# Patient Record
Sex: FEMALE | Race: WHITE | Employment: UNEMPLOYED | URBAN - METROPOLITAN AREA
[De-identification: names, ages, dates, MRNs, and addresses within clinical notes are randomized per-mention and may not be internally consistent; named-entity substitution may affect disease eponyms.]

---

## 2020-08-17 ENCOUNTER — APPOINTMENT (OUTPATIENT)
Dept: GENERAL RADIOLOGY | Age: 60
DRG: 872 | End: 2020-08-17

## 2020-08-17 ENCOUNTER — APPOINTMENT (OUTPATIENT)
Dept: CT IMAGING | Age: 60
DRG: 872 | End: 2020-08-17

## 2020-08-17 ENCOUNTER — HOSPITAL ENCOUNTER (INPATIENT)
Age: 60
LOS: 2 days | Discharge: HOME | DRG: 872 | End: 2020-08-19
Attending: EMERGENCY MEDICINE | Admitting: HOSPITALIST
Payer: MEDICAID

## 2020-08-17 PROBLEM — A41.9 SEPSIS DUE TO URINARY TRACT INFECTION (HCC): Status: ACTIVE | Noted: 2020-08-17

## 2020-08-17 PROBLEM — N39.0 SEPSIS DUE TO URINARY TRACT INFECTION (HCC): Status: ACTIVE | Noted: 2020-08-17

## 2020-08-17 PROBLEM — D69.6 THROMBOCYTOPENIA (HCC): Status: ACTIVE | Noted: 2020-08-17

## 2020-08-17 PROBLEM — F10.20 ALCOHOLISM, CHRONIC (HCC): Status: ACTIVE | Noted: 2020-08-17

## 2020-08-17 PROBLEM — E87.20 LACTIC ACIDOSIS: Status: ACTIVE | Noted: 2020-08-17

## 2020-08-17 PROBLEM — K70.30 ALCOHOLIC CIRRHOSIS (HCC): Status: ACTIVE | Noted: 2020-08-17

## 2020-08-17 LAB
A/G RATIO: 1.4 (ref 1.1–2.2)
A/G RATIO: 1.5 (ref 1.1–2.2)
ABO/RH: NORMAL
ACETAMINOPHEN LEVEL: <5 UG/ML (ref 10–30)
ALBUMIN SERPL-MCNC: 4.1 G/DL (ref 3.4–5)
ALBUMIN SERPL-MCNC: 4.6 G/DL (ref 3.4–5)
ALP BLD-CCNC: 105 U/L (ref 40–129)
ALP BLD-CCNC: 125 U/L (ref 40–129)
ALT SERPL-CCNC: 64 U/L (ref 10–40)
ALT SERPL-CCNC: 70 U/L (ref 10–40)
AMPHETAMINE SCREEN, URINE: NORMAL
ANION GAP SERPL CALCULATED.3IONS-SCNC: 18 MMOL/L (ref 3–16)
ANION GAP SERPL CALCULATED.3IONS-SCNC: 25 MMOL/L (ref 3–16)
ANTIBODY SCREEN: NORMAL
APTT: 30.6 SEC (ref 24.2–36.2)
AST SERPL-CCNC: 144 U/L (ref 15–37)
AST SERPL-CCNC: 161 U/L (ref 15–37)
BACTERIA: ABNORMAL /HPF
BARBITURATE SCREEN URINE: NORMAL
BASE EXCESS ARTERIAL: 1.6 MMOL/L (ref -3–3)
BASOPHILS ABSOLUTE: 0 K/UL (ref 0–0.2)
BASOPHILS RELATIVE PERCENT: 0.8 %
BENZODIAZEPINE SCREEN, URINE: NORMAL
BETA-HYDROXYBUTYRATE: 3 MMOL/L (ref 0–0.27)
BILIRUB SERPL-MCNC: 1.4 MG/DL (ref 0–1)
BILIRUB SERPL-MCNC: 2 MG/DL (ref 0–1)
BILIRUBIN DIRECT: 0.9 MG/DL (ref 0–0.3)
BILIRUBIN URINE: ABNORMAL
BILIRUBIN, INDIRECT: 1.1 MG/DL (ref 0–1)
BLOOD, URINE: ABNORMAL
BUN BLDV-MCNC: 11 MG/DL (ref 7–20)
BUN BLDV-MCNC: 13 MG/DL (ref 7–20)
CALCIUM SERPL-MCNC: 8.5 MG/DL (ref 8.3–10.6)
CALCIUM SERPL-MCNC: 9.5 MG/DL (ref 8.3–10.6)
CANNABINOID SCREEN URINE: NORMAL
CARBOXYHEMOGLOBIN ARTERIAL: 3.6 % (ref 0–1.5)
CHLORIDE BLD-SCNC: 98 MMOL/L (ref 99–110)
CHLORIDE BLD-SCNC: 99 MMOL/L (ref 99–110)
CLARITY: ABNORMAL
CO2: 14 MMOL/L (ref 21–32)
CO2: 24 MMOL/L (ref 21–32)
COCAINE METABOLITE SCREEN URINE: NORMAL
COLOR: ABNORMAL
CREAT SERPL-MCNC: <0.5 MG/DL (ref 0.6–1.1)
CREAT SERPL-MCNC: <0.5 MG/DL (ref 0.6–1.1)
D DIMER: 288 NG/ML DDU (ref 0–229)
EOSINOPHILS ABSOLUTE: 0 K/UL (ref 0–0.6)
EOSINOPHILS RELATIVE PERCENT: 0.8 %
EPITHELIAL CELLS, UA: 18 /HPF (ref 0–5)
ETHANOL: 331 MG/DL (ref 0–0.08)
FIBRINOGEN: 247 MG/DL (ref 200–397)
GFR AFRICAN AMERICAN: >60
GFR AFRICAN AMERICAN: >60
GFR NON-AFRICAN AMERICAN: >60
GFR NON-AFRICAN AMERICAN: >60
GLOBULIN: 2.7 G/DL
GLOBULIN: 3.3 G/DL
GLUCOSE BLD-MCNC: 165 MG/DL (ref 70–99)
GLUCOSE BLD-MCNC: 270 MG/DL (ref 70–99)
GLUCOSE BLD-MCNC: 96 MG/DL (ref 70–99)
GLUCOSE URINE: NEGATIVE MG/DL
HCO3 ARTERIAL: 26.1 MMOL/L (ref 21–29)
HCT VFR BLD CALC: 48.3 % (ref 36–48)
HEMOGLOBIN, ART, EXTENDED: 15.8 G/DL (ref 12–16)
HEMOGLOBIN: 16.5 G/DL (ref 12–16)
INR BLD: 0.99 (ref 0.86–1.14)
KETONES, URINE: 40 MG/DL
LACTATE DEHYDROGENASE: 232 U/L (ref 100–190)
LACTIC ACID, SEPSIS: 4.7 MMOL/L (ref 0.4–1.9)
LACTIC ACID, SEPSIS: 7.2 MMOL/L (ref 0.4–1.9)
LACTIC ACID, SEPSIS: 9.5 MMOL/L (ref 0.4–1.9)
LACTIC ACID: 9.6 MMOL/L (ref 0.4–2)
LEUKOCYTE ESTERASE, URINE: ABNORMAL
LIPASE: 9 U/L (ref 13–60)
LYMPHOCYTES ABSOLUTE: 1.6 K/UL (ref 1–5.1)
LYMPHOCYTES RELATIVE PERCENT: 38.4 %
Lab: NORMAL
MCH RBC QN AUTO: 35.7 PG (ref 26–34)
MCHC RBC AUTO-ENTMCNC: 34.1 G/DL (ref 31–36)
MCV RBC AUTO: 104.5 FL (ref 80–100)
METHADONE SCREEN, URINE: NORMAL
METHEMOGLOBIN ARTERIAL: ABNORMAL %
MICROSCOPIC EXAMINATION: YES
MONOCYTES ABSOLUTE: 0.4 K/UL (ref 0–1.3)
MONOCYTES RELATIVE PERCENT: 8.4 %
NEUTROPHILS ABSOLUTE: 2.2 K/UL (ref 1.7–7.7)
NEUTROPHILS RELATIVE PERCENT: 51.6 %
NITRITE, URINE: POSITIVE
O2 CONTENT ARTERIAL: 20 ML/DL
O2 SAT, ARTERIAL: 95.1 %
O2 THERAPY: ABNORMAL
OPIATE SCREEN URINE: NORMAL
OXYCODONE URINE: NORMAL
PCO2 ARTERIAL: 40.1 MMHG (ref 35–45)
PDW BLD-RTO: 13.2 % (ref 12.4–15.4)
PERFORMED ON: ABNORMAL
PH ARTERIAL: 7.42 (ref 7.35–7.45)
PH UA: 5.5
PH UA: 5.5 (ref 5–8)
PHENCYCLIDINE SCREEN URINE: NORMAL
PLATELET # BLD: 104 K/UL (ref 135–450)
PMV BLD AUTO: 8.1 FL (ref 5–10.5)
PO2 ARTERIAL: 78.6 MMHG (ref 75–108)
POTASSIUM REFLEX MAGNESIUM: 4.1 MMOL/L (ref 3.5–5.1)
POTASSIUM REFLEX MAGNESIUM: 4.1 MMOL/L (ref 3.5–5.1)
PRO-BNP: 71 PG/ML (ref 0–124)
PROCALCITONIN: 0.13 NG/ML (ref 0–0.15)
PROPOXYPHENE SCREEN: NORMAL
PROTEIN UA: 30 MG/DL
PROTHROMBIN TIME: 11.5 SEC (ref 10–13.2)
RBC # BLD: 4.62 M/UL (ref 4–5.2)
RBC UA: 1 /HPF (ref 0–4)
SALICYLATE, SERUM: <0.3 MG/DL (ref 15–30)
SODIUM BLD-SCNC: 137 MMOL/L (ref 136–145)
SODIUM BLD-SCNC: 141 MMOL/L (ref 136–145)
SPECIFIC GRAVITY UA: 1.02 (ref 1–1.03)
TCO2 ARTERIAL: 61.3 MMOL/L
TOTAL CK: 71 U/L (ref 26–192)
TOTAL PROTEIN: 6.8 G/DL (ref 6.4–8.2)
TOTAL PROTEIN: 7.9 G/DL (ref 6.4–8.2)
TROPONIN: <0.01 NG/ML
URINE REFLEX TO CULTURE: ABNORMAL
URINE TYPE: ABNORMAL
UROBILINOGEN, URINE: 1 E.U./DL
WBC # BLD: 4.3 K/UL (ref 4–11)
WBC UA: 6 /HPF (ref 0–5)

## 2020-08-17 PROCEDURE — 84146 ASSAY OF PROLACTIN: CPT

## 2020-08-17 PROCEDURE — G0480 DRUG TEST DEF 1-7 CLASSES: HCPCS

## 2020-08-17 PROCEDURE — 87040 BLOOD CULTURE FOR BACTERIA: CPT

## 2020-08-17 PROCEDURE — 6360000002 HC RX W HCPCS: Performed by: HOSPITALIST

## 2020-08-17 PROCEDURE — 85730 THROMBOPLASTIN TIME PARTIAL: CPT

## 2020-08-17 PROCEDURE — 81001 URINALYSIS AUTO W/SCOPE: CPT

## 2020-08-17 PROCEDURE — 36415 COLL VENOUS BLD VENIPUNCTURE: CPT

## 2020-08-17 PROCEDURE — 36600 WITHDRAWAL OF ARTERIAL BLOOD: CPT

## 2020-08-17 PROCEDURE — 93005 ELECTROCARDIOGRAM TRACING: CPT | Performed by: EMERGENCY MEDICINE

## 2020-08-17 PROCEDURE — 2700000000 HC OXYGEN THERAPY PER DAY

## 2020-08-17 PROCEDURE — 99285 EMERGENCY DEPT VISIT HI MDM: CPT

## 2020-08-17 PROCEDURE — 2000000000 HC ICU R&B

## 2020-08-17 PROCEDURE — 83036 HEMOGLOBIN GLYCOSYLATED A1C: CPT

## 2020-08-17 PROCEDURE — 85384 FIBRINOGEN ACTIVITY: CPT

## 2020-08-17 PROCEDURE — 80053 COMPREHEN METABOLIC PANEL: CPT

## 2020-08-17 PROCEDURE — 82550 ASSAY OF CK (CPK): CPT

## 2020-08-17 PROCEDURE — 6370000000 HC RX 637 (ALT 250 FOR IP): Performed by: PHYSICIAN ASSISTANT

## 2020-08-17 PROCEDURE — 85025 COMPLETE CBC W/AUTO DIFF WBC: CPT

## 2020-08-17 PROCEDURE — 86901 BLOOD TYPING SEROLOGIC RH(D): CPT

## 2020-08-17 PROCEDURE — 82803 BLOOD GASES ANY COMBINATION: CPT

## 2020-08-17 PROCEDURE — 2500000003 HC RX 250 WO HCPCS: Performed by: HOSPITALIST

## 2020-08-17 PROCEDURE — 2500000003 HC RX 250 WO HCPCS: Performed by: PHYSICIAN ASSISTANT

## 2020-08-17 PROCEDURE — 86850 RBC ANTIBODY SCREEN: CPT

## 2020-08-17 PROCEDURE — 82728 ASSAY OF FERRITIN: CPT

## 2020-08-17 PROCEDURE — 84484 ASSAY OF TROPONIN QUANT: CPT

## 2020-08-17 PROCEDURE — 96374 THER/PROPH/DIAG INJ IV PUSH: CPT

## 2020-08-17 PROCEDURE — 83880 ASSAY OF NATRIURETIC PEPTIDE: CPT

## 2020-08-17 PROCEDURE — 2580000003 HC RX 258: Performed by: HOSPITALIST

## 2020-08-17 PROCEDURE — 84145 PROCALCITONIN (PCT): CPT

## 2020-08-17 PROCEDURE — 82010 KETONE BODYS QUAN: CPT

## 2020-08-17 PROCEDURE — 74174 CTA ABD&PLVS W/CONTRAST: CPT

## 2020-08-17 PROCEDURE — U0003 INFECTIOUS AGENT DETECTION BY NUCLEIC ACID (DNA OR RNA); SEVERE ACUTE RESPIRATORY SYNDROME CORONAVIRUS 2 (SARS-COV-2) (CORONAVIRUS DISEASE [COVID-19]), AMPLIFIED PROBE TECHNIQUE, MAKING USE OF HIGH THROUGHPUT TECHNOLOGIES AS DESCRIBED BY CMS-2020-01-R: HCPCS

## 2020-08-17 PROCEDURE — 6360000002 HC RX W HCPCS: Performed by: EMERGENCY MEDICINE

## 2020-08-17 PROCEDURE — 6370000000 HC RX 637 (ALT 250 FOR IP): Performed by: HOSPITALIST

## 2020-08-17 PROCEDURE — 6360000002 HC RX W HCPCS: Performed by: PHYSICIAN ASSISTANT

## 2020-08-17 PROCEDURE — 83605 ASSAY OF LACTIC ACID: CPT

## 2020-08-17 PROCEDURE — 83690 ASSAY OF LIPASE: CPT

## 2020-08-17 PROCEDURE — 94640 AIRWAY INHALATION TREATMENT: CPT

## 2020-08-17 PROCEDURE — 80307 DRUG TEST PRSMV CHEM ANLYZR: CPT

## 2020-08-17 PROCEDURE — 2580000003 HC RX 258: Performed by: PHYSICIAN ASSISTANT

## 2020-08-17 PROCEDURE — 85610 PROTHROMBIN TIME: CPT

## 2020-08-17 PROCEDURE — 71045 X-RAY EXAM CHEST 1 VIEW: CPT

## 2020-08-17 PROCEDURE — 6360000004 HC RX CONTRAST MEDICATION: Performed by: PHYSICIAN ASSISTANT

## 2020-08-17 PROCEDURE — 85379 FIBRIN DEGRADATION QUANT: CPT

## 2020-08-17 PROCEDURE — 94760 N-INVAS EAR/PLS OXIMETRY 1: CPT

## 2020-08-17 PROCEDURE — 83615 LACTATE (LD) (LDH) ENZYME: CPT

## 2020-08-17 PROCEDURE — 86900 BLOOD TYPING SEROLOGIC ABO: CPT

## 2020-08-17 PROCEDURE — 96375 TX/PRO/DX INJ NEW DRUG ADDON: CPT

## 2020-08-17 RX ORDER — SODIUM CHLORIDE, SODIUM LACTATE, POTASSIUM CHLORIDE, AND CALCIUM CHLORIDE .6; .31; .03; .02 G/100ML; G/100ML; G/100ML; G/100ML
30 INJECTION, SOLUTION INTRAVENOUS ONCE
Status: COMPLETED | OUTPATIENT
Start: 2020-08-17 | End: 2020-08-17

## 2020-08-17 RX ORDER — LORAZEPAM 1 MG/1
4 TABLET ORAL
Status: DISCONTINUED | OUTPATIENT
Start: 2020-08-17 | End: 2020-08-19 | Stop reason: HOSPADM

## 2020-08-17 RX ORDER — LORAZEPAM 1 MG/1
3 TABLET ORAL
Status: DISCONTINUED | OUTPATIENT
Start: 2020-08-17 | End: 2020-08-19 | Stop reason: HOSPADM

## 2020-08-17 RX ORDER — ACETAMINOPHEN 325 MG/1
650 TABLET ORAL EVERY 6 HOURS PRN
Status: DISCONTINUED | OUTPATIENT
Start: 2020-08-17 | End: 2020-08-19 | Stop reason: HOSPADM

## 2020-08-17 RX ORDER — PROMETHAZINE HYDROCHLORIDE 25 MG/1
12.5 TABLET ORAL EVERY 6 HOURS PRN
Status: DISCONTINUED | OUTPATIENT
Start: 2020-08-17 | End: 2020-08-19 | Stop reason: HOSPADM

## 2020-08-17 RX ORDER — IPRATROPIUM BROMIDE AND ALBUTEROL SULFATE 2.5; .5 MG/3ML; MG/3ML
1 SOLUTION RESPIRATORY (INHALATION) ONCE
Status: COMPLETED | OUTPATIENT
Start: 2020-08-17 | End: 2020-08-17

## 2020-08-17 RX ORDER — METHYLPREDNISOLONE SODIUM SUCCINATE 125 MG/2ML
125 INJECTION, POWDER, LYOPHILIZED, FOR SOLUTION INTRAMUSCULAR; INTRAVENOUS ONCE
Status: COMPLETED | OUTPATIENT
Start: 2020-08-17 | End: 2020-08-17

## 2020-08-17 RX ORDER — LORAZEPAM 2 MG/ML
2 INJECTION INTRAMUSCULAR
Status: DISCONTINUED | OUTPATIENT
Start: 2020-08-17 | End: 2020-08-19 | Stop reason: HOSPADM

## 2020-08-17 RX ORDER — DEXTROSE MONOHYDRATE 25 G/50ML
INJECTION, SOLUTION INTRAVENOUS
Status: DISPENSED
Start: 2020-08-17 | End: 2020-08-18

## 2020-08-17 RX ORDER — NICOTINE POLACRILEX 4 MG
15 LOZENGE BUCCAL PRN
Status: DISCONTINUED | OUTPATIENT
Start: 2020-08-17 | End: 2020-08-19 | Stop reason: HOSPADM

## 2020-08-17 RX ORDER — MAGNESIUM SULFATE 1 G/100ML
1 INJECTION INTRAVENOUS PRN
Status: DISCONTINUED | OUTPATIENT
Start: 2020-08-17 | End: 2020-08-19 | Stop reason: HOSPADM

## 2020-08-17 RX ORDER — DEXTROSE, SODIUM CHLORIDE, SODIUM LACTATE, POTASSIUM CHLORIDE, AND CALCIUM CHLORIDE 5; .6; .31; .03; .02 G/100ML; G/100ML; G/100ML; G/100ML; G/100ML
INJECTION, SOLUTION INTRAVENOUS
Status: DISPENSED
Start: 2020-08-17 | End: 2020-08-18

## 2020-08-17 RX ORDER — ACETAMINOPHEN 650 MG/1
650 SUPPOSITORY RECTAL EVERY 6 HOURS PRN
Status: DISCONTINUED | OUTPATIENT
Start: 2020-08-17 | End: 2020-08-19 | Stop reason: HOSPADM

## 2020-08-17 RX ORDER — LORAZEPAM 1 MG/1
2 TABLET ORAL
Status: DISCONTINUED | OUTPATIENT
Start: 2020-08-17 | End: 2020-08-19 | Stop reason: HOSPADM

## 2020-08-17 RX ORDER — POTASSIUM CHLORIDE 20 MEQ/1
40 TABLET, EXTENDED RELEASE ORAL PRN
Status: DISCONTINUED | OUTPATIENT
Start: 2020-08-17 | End: 2020-08-19 | Stop reason: HOSPADM

## 2020-08-17 RX ORDER — DEXTROSE, SODIUM CHLORIDE, SODIUM LACTATE, POTASSIUM CHLORIDE, AND CALCIUM CHLORIDE 5; .6; .31; .03; .02 G/100ML; G/100ML; G/100ML; G/100ML; G/100ML
INJECTION, SOLUTION INTRAVENOUS CONTINUOUS
Status: DISPENSED | OUTPATIENT
Start: 2020-08-17 | End: 2020-08-18

## 2020-08-17 RX ORDER — DEXTROSE MONOHYDRATE 25 G/50ML
12.5 INJECTION, SOLUTION INTRAVENOUS ONCE
Status: COMPLETED | OUTPATIENT
Start: 2020-08-17 | End: 2020-08-17

## 2020-08-17 RX ORDER — SODIUM CHLORIDE 0.9 % (FLUSH) 0.9 %
10 SYRINGE (ML) INJECTION PRN
Status: DISCONTINUED | OUTPATIENT
Start: 2020-08-17 | End: 2020-08-17 | Stop reason: SDUPTHER

## 2020-08-17 RX ORDER — NICOTINE 21 MG/24HR
1 PATCH, TRANSDERMAL 24 HOURS TRANSDERMAL DAILY
Status: DISCONTINUED | OUTPATIENT
Start: 2020-08-17 | End: 2020-08-19 | Stop reason: HOSPADM

## 2020-08-17 RX ORDER — LORAZEPAM 1 MG/1
1 TABLET ORAL
Status: DISCONTINUED | OUTPATIENT
Start: 2020-08-17 | End: 2020-08-19 | Stop reason: HOSPADM

## 2020-08-17 RX ORDER — SODIUM CHLORIDE 0.9 % (FLUSH) 0.9 %
10 SYRINGE (ML) INJECTION EVERY 12 HOURS SCHEDULED
Status: DISCONTINUED | OUTPATIENT
Start: 2020-08-17 | End: 2020-08-17 | Stop reason: SDUPTHER

## 2020-08-17 RX ORDER — INSULIN LISPRO 100 [IU]/ML
0-6 INJECTION, SOLUTION INTRAVENOUS; SUBCUTANEOUS EVERY 4 HOURS
Status: DISCONTINUED | OUTPATIENT
Start: 2020-08-17 | End: 2020-08-19 | Stop reason: HOSPADM

## 2020-08-17 RX ORDER — SODIUM CHLORIDE 0.9 % (FLUSH) 0.9 %
10 SYRINGE (ML) INJECTION EVERY 12 HOURS SCHEDULED
Status: DISCONTINUED | OUTPATIENT
Start: 2020-08-17 | End: 2020-08-19 | Stop reason: HOSPADM

## 2020-08-17 RX ORDER — ACETAMINOPHEN 325 MG/1
650 TABLET ORAL EVERY 6 HOURS PRN
Status: DISCONTINUED | OUTPATIENT
Start: 2020-08-17 | End: 2020-08-18 | Stop reason: SDUPTHER

## 2020-08-17 RX ORDER — LORAZEPAM 2 MG/ML
3 INJECTION INTRAMUSCULAR
Status: DISCONTINUED | OUTPATIENT
Start: 2020-08-17 | End: 2020-08-19 | Stop reason: HOSPADM

## 2020-08-17 RX ORDER — ACETAMINOPHEN 650 MG/1
650 SUPPOSITORY RECTAL EVERY 6 HOURS PRN
Status: DISCONTINUED | OUTPATIENT
Start: 2020-08-17 | End: 2020-08-18 | Stop reason: SDUPTHER

## 2020-08-17 RX ORDER — ALBUTEROL SULFATE 2.5 MG/3ML
5 SOLUTION RESPIRATORY (INHALATION) ONCE
Status: DISCONTINUED | OUTPATIENT
Start: 2020-08-17 | End: 2020-08-17

## 2020-08-17 RX ORDER — ONDANSETRON 2 MG/ML
4 INJECTION INTRAMUSCULAR; INTRAVENOUS EVERY 6 HOURS PRN
Status: DISCONTINUED | OUTPATIENT
Start: 2020-08-17 | End: 2020-08-19 | Stop reason: HOSPADM

## 2020-08-17 RX ORDER — ONDANSETRON 2 MG/ML
4 INJECTION INTRAMUSCULAR; INTRAVENOUS ONCE
Status: DISCONTINUED | OUTPATIENT
Start: 2020-08-17 | End: 2020-08-17 | Stop reason: HOSPADM

## 2020-08-17 RX ORDER — DEXTROSE MONOHYDRATE 50 MG/ML
100 INJECTION, SOLUTION INTRAVENOUS PRN
Status: DISCONTINUED | OUTPATIENT
Start: 2020-08-17 | End: 2020-08-19 | Stop reason: HOSPADM

## 2020-08-17 RX ORDER — DEXTROSE MONOHYDRATE 25 G/50ML
12.5 INJECTION, SOLUTION INTRAVENOUS PRN
Status: DISCONTINUED | OUTPATIENT
Start: 2020-08-17 | End: 2020-08-19 | Stop reason: HOSPADM

## 2020-08-17 RX ORDER — SODIUM CHLORIDE 0.9 % (FLUSH) 0.9 %
10 SYRINGE (ML) INJECTION PRN
Status: DISCONTINUED | OUTPATIENT
Start: 2020-08-17 | End: 2020-08-19 | Stop reason: HOSPADM

## 2020-08-17 RX ORDER — CHOLECALCIFEROL (VITAMIN D3) 125 MCG
1 TABLET ORAL DAILY
COMMUNITY

## 2020-08-17 RX ORDER — POTASSIUM CHLORIDE 7.45 MG/ML
10 INJECTION INTRAVENOUS PRN
Status: DISCONTINUED | OUTPATIENT
Start: 2020-08-17 | End: 2020-08-19 | Stop reason: HOSPADM

## 2020-08-17 RX ORDER — LORAZEPAM 2 MG/ML
1 INJECTION INTRAMUSCULAR
Status: DISCONTINUED | OUTPATIENT
Start: 2020-08-17 | End: 2020-08-19 | Stop reason: HOSPADM

## 2020-08-17 RX ORDER — LORAZEPAM 2 MG/ML
4 INJECTION INTRAMUSCULAR
Status: DISCONTINUED | OUTPATIENT
Start: 2020-08-17 | End: 2020-08-19 | Stop reason: HOSPADM

## 2020-08-17 RX ADMIN — Medication 1 G: at 15:06

## 2020-08-17 RX ADMIN — SODIUM BICARBONATE 50 MEQ: 84 INJECTION, SOLUTION INTRAVENOUS at 19:09

## 2020-08-17 RX ADMIN — METHYLPREDNISOLONE SODIUM SUCCINATE 125 MG: 125 INJECTION, POWDER, FOR SOLUTION INTRAMUSCULAR; INTRAVENOUS at 11:57

## 2020-08-17 RX ADMIN — IPRATROPIUM BROMIDE AND ALBUTEROL SULFATE 1 AMPULE: .5; 3 SOLUTION RESPIRATORY (INHALATION) at 12:21

## 2020-08-17 RX ADMIN — LORAZEPAM 1 MG: 2 INJECTION INTRAMUSCULAR; INTRAVENOUS at 19:28

## 2020-08-17 RX ADMIN — Medication 10 ML: at 22:25

## 2020-08-17 RX ADMIN — DEXTROSE MONOHYDRATE 12.5 G: 500 INJECTION PARENTERAL at 19:08

## 2020-08-17 RX ADMIN — IOPAMIDOL 100 ML: 755 INJECTION, SOLUTION INTRAVENOUS at 16:24

## 2020-08-17 RX ADMIN — INSULIN LISPRO 3 UNITS: 100 INJECTION, SOLUTION INTRAVENOUS; SUBCUTANEOUS at 22:31

## 2020-08-17 RX ADMIN — VANCOMYCIN HYDROCHLORIDE 1000 MG: 1 INJECTION, POWDER, LYOPHILIZED, FOR SOLUTION INTRAVENOUS at 22:18

## 2020-08-17 RX ADMIN — SODIUM CHLORIDE, SODIUM LACTATE, POTASSIUM CHLORIDE, CALCIUM CHLORIDE AND DEXTROSE MONOHYDRATE: 5; 600; 310; 30; 20 INJECTION, SOLUTION INTRAVENOUS at 19:13

## 2020-08-17 RX ADMIN — FOLIC ACID: 5 INJECTION, SOLUTION INTRAMUSCULAR; INTRAVENOUS; SUBCUTANEOUS at 11:58

## 2020-08-17 RX ADMIN — SODIUM CHLORIDE, POTASSIUM CHLORIDE, SODIUM LACTATE AND CALCIUM CHLORIDE 1986 ML: 600; 310; 30; 20 INJECTION, SOLUTION INTRAVENOUS at 15:06

## 2020-08-17 RX ADMIN — ALBUTEROL SULFATE 5 MG: 2.5 SOLUTION RESPIRATORY (INHALATION) at 12:21

## 2020-08-17 ASSESSMENT — ENCOUNTER SYMPTOMS
BACK PAIN: 0
WHEEZING: 1
VOMITING: 0
NAUSEA: 1
COUGH: 1
CHEST TIGHTNESS: 0
DIARRHEA: 0
ABDOMINAL PAIN: 0
SHORTNESS OF BREATH: 1

## 2020-08-17 ASSESSMENT — PAIN SCALES - GENERAL
PAINLEVEL_OUTOF10: 0
PAINLEVEL_OUTOF10: 0

## 2020-08-17 NOTE — ED PROVIDER NOTES
Patient states that at this time she thinks that time to turn her life around. She denies that she is experiencing substernal chest pain. She denies fevers and or chills. She denies a history of DVT and or PE. She denies unilateral leg pain or swelling or history of thromboembolic events. Patient states that she does not have abdominal or flank pain. No vomiting or diarrhea. No additional complaints or concerns at present. Nursing Notes were all reviewed and agreed with or any disagreements were addressed in the HPI. REVIEW OF SYSTEMS    (2-9 systems for level 4, 10 or more for level 5)     Review of Systems   Constitutional: Positive for fatigue. Negative for activity change, chills and fever. Respiratory: Positive for cough, shortness of breath and wheezing. Negative for chest tightness. Cardiovascular: Negative for chest pain, palpitations and leg swelling. Gastrointestinal: Positive for nausea. Negative for abdominal pain, diarrhea and vomiting. Genitourinary: Negative for dysuria and flank pain. Musculoskeletal: Negative for back pain and myalgias. Skin: Negative for rash and wound. Neurological: Positive for weakness. Negative for headaches. Psychiatric/Behavioral: Positive for sleep disturbance. Negative for agitation, confusion, hallucinations, self-injury and suicidal ideas. The patient is nervous/anxious. Positives and Pertinent negatives as per HPI. Except as noted above in the ROS, all other systems were reviewed and negative. PAST MEDICAL HISTORY   History reviewed. No pertinent past medical history. SURGICAL HISTORY   History reviewed. No pertinent surgical history.       CURRENTMEDICATIONS       Previous Medications    B COMPLEX VITAMINS (VITAMIN B COMPLEX PO)    Take 1 tablet by mouth daily    CAFFEINE-MAGNESIUM SALICYLATE (DIUREX PO)    Take 1 tablet by mouth daily    ERGOCALCIFEROL (VITAMIN D2) 50 MCG (2000 UT) TABS    Take 1 tablet by mouth daily MULTIPLE VITAMINS-MINERALS (MULTIVITAMIN ADULT PO)    Take 1 tablet by mouth daily         ALLERGIES     Patient has no known allergies. FAMILYHISTORY     History reviewed. No pertinent family history. SOCIAL HISTORY       Social History     Tobacco Use    Smoking status: Not on file   Substance Use Topics    Alcohol use: Yes     Comment: pint a day    Drug use: Not on file       SCREENINGS             PHYSICAL EXAM    (up to 7 for level 4, 8 or more for level 5)     ED Triage Vitals [08/17/20 1103]   BP Temp Temp Source Pulse Resp SpO2 Height Weight   (!) 164/71 98.3 °F (36.8 °C) Temporal 106 24 93 % 5' 9\" (1.753 m) 160 lb (72.6 kg)       Physical Exam  Vitals signs and nursing note reviewed. Constitutional:       General: She is awake. She is not in acute distress. Appearance: Normal appearance. She is well-developed. She is not diaphoretic. HENT:      Head: Normocephalic and atraumatic. No raccoon eyes or Alonso's sign. Right Ear: Hearing and external ear normal.      Left Ear: Hearing and external ear normal.      Nose: Nose normal.      Mouth/Throat:      Lips: Pink. Mouth: Mucous membranes are moist.   Eyes:      General: No scleral icterus. Right eye: No discharge. Left eye: No discharge. Conjunctiva/sclera: Conjunctivae normal.   Neck:      Musculoskeletal: Normal range of motion. Vascular: No JVD. Cardiovascular:      Rate and Rhythm: Regular rhythm. Tachycardia present. Heart sounds: No murmur. No friction rub. No gallop. Pulmonary:      Effort: Pulmonary effort is normal. Tachypnea present. No accessory muscle usage or respiratory distress. Breath sounds: Normal air entry. Wheezing present. No rhonchi or rales. Abdominal:      General: There is no distension. Palpations: Abdomen is soft. Abdomen is not rigid. There is no mass. Tenderness: There is no abdominal tenderness. There is no guarding or rebound.    Skin:     General: Skin is warm and dry. Neurological:      Mental Status: She is alert and oriented to person, place, and time. GCS: GCS eye subscore is 4. GCS verbal subscore is 5. GCS motor subscore is 6. Cranial Nerves: No cranial nerve deficit. Sensory: No sensory deficit. Coordination: Coordination normal.   Psychiatric:         Behavior: Behavior normal. Behavior is cooperative.          DIAGNOSTIC RESULTS   LABS:    Labs Reviewed   CBC WITH AUTO DIFFERENTIAL - Abnormal; Notable for the following components:       Result Value    Hemoglobin 16.5 (*)     Hematocrit 48.3 (*)     .5 (*)     MCH 35.7 (*)     Platelets 218 (*)     All other components within normal limits    Narrative:     Performed at:  [facility] [address]   Phone [phone]   COMPREHENSIVE METABOLIC PANEL W/ REFLEX TO MG FOR LOW K - Abnormal; Notable for the following components:    Anion Gap 18 (*)     CREATININE <0.5 (*)     Total Bilirubin 2.0 (*)     ALT 70 (*)      (*)     All other components within normal limits    Narrative:     Performed at:  [facility] [address],  [place], [address]   Phone [phone]   HEPATIC FUNCTION PANEL - Abnormal; Notable for the following components:    Bilirubin, Direct 0.9 (*)     Bilirubin, Indirect 1.1 (*)     All other components within normal limits    Narrative:     Performed at:  [facility] [address]   Phone [phone]   LIPASE - Abnormal; Notable for the following components:    Lipase 9.0 (*)     All other components within normal limits    Narrative:     Performed at:  [facility] [address]   Phone [phone]   URINE RT REFLEX TO CULTURE - Abnormal; Notable for the following components:    Color, UA DARK YELLOW (*)     Clarity, UA CLOUDY (*) [phone]   CULTURE, BLOOD 1   CULTURE, BLOOD 2   PROTIME-INR    Narrative:     Performed at:  [facility]  [address] [place],  [place], [address]   Phone [phone]   ETHANOL    Narrative:     Performed at:  [facility]  [address] [facility] [facility],  [place], [address]   Phone [phone]   URINE DRUG SCREEN    Narrative:     Performed at:  [facility] [address] [facility],  [place], [address]   Phone [phone]   TROPONIN    Narrative:     Performed at:  [facility] [address] [facility],  [place], [address]   Phone [phone]   BRAIN NATRIURETIC PEPTIDE    Narrative:     Performed at:  [facility] [address] [facility],  [place], [address]   Phone [phone]   LACTATE, SEPSIS       All other labs were within normal range or not returned as of this dictation. EKG: All EKG's are interpreted by the Emergency Department Physician in the absence of a cardiologist.  Please see their note for interpretation of EKG. RADIOLOGY:   Non-plain film images such as CT, Ultrasound and MRI are read by the radiologist. Plain radiographic images are visualized and preliminarily interpreted by the ED Provider with the below findings:        Interpretation per the Radiologist below, if available at the time of this note:    XR CHEST PORTABLE   Final Result   No acute process.          CTA CHEST W CONTRAST    (Results Pending)   CTA CHEST ABDOMEN PELVIS W CONTRAST    (Results Pending)         PROCEDURES   Unless otherwise noted below, none     Procedures    CRITICAL CARE TIME   Because of the high probability of sudden clinical deterioration of the patients condition and to prevent further deterioration, my critical care time involved my full attention to the patients condition, and included chart data review, documentation, medication ordering, viewing the patients old ringers bolus (has no administration in time range)   iopamidol (ISOVUE-370) 76 % injection 75 mL (has no administration in time range)   sodium chloride 0.9 % 3,199 mL with folic acid 1 mg, adult multi-vitamin with vitamin k 10 mL, thiamine 300 mg ( Intravenous Stopped 8/17/20 1405)   ipratropium-albuterol (DUONEB) nebulizer solution 1 ampule (1 ampule Inhalation Given 8/17/20 1221)   methylPREDNISolone sodium (SOLU-MEDROL) injection 125 mg (125 mg Intravenous Given 8/17/20 1157)   albuterol (PROVENTIL) nebulizer solution 5 mg (5 mg Nebulization Given 8/17/20 1221)           The patient's detailed history of present illness is documented as above. Upon arrival to the emergency department the patient's vital signs are as documented. The patient is noted to be hemodynamically stable and afebrile. Physical examination findings are as above. IV access was obtained laboratory testing and work-up was initiated. Patient was having significant wheezing and likely has a diagnosis of COPD despite having previously been diagnosed with this. She was getting breathing treatments as above and then was noted to be resting much more comfortably. CBC demonstrates no evidence of leukocytosis. She is a bit hemoconcentrated with a hemoglobin of 16.5 hematocrit of 48.3 and there is evidence of thrombocytopenia at 104. BUN is 13 creatinine is less than 0.5 nonfasting glucose 96 electrolytes are noted to be normal her CO2 is 21 but she does have a gap of 18. Bilirubinemia with a total bili of 2.0 and a direct 5.9. Transaminitis at 70 and 161 respectively. Lipase is 9. Coags unremarkable. Troponin is less than 0.01 proBNP is 71. Salicylate and Tylenol are normal.  Her venous blood gas demonstrates no evidence of acidosis. UA demonstrates evidence of infection. Her ethanol level is 331. Toxicology screening as documented she has a lactic acidosis at 4.7.   When her urine returns positive for infection she does make severe sepsis. She was given a balance of IV fluids in addition to the banana bag she had already received. She will require ongoing care management on an inpatient basis. Portable chest x-ray demonstrates no evidence of acute cardiopulmonary process. In light of the above-mentioned ongoing care management on inpatient basis has the case discussed with the hospitalist.  Dr. Kelton Silva accepts the patient for admission for ongoing care management the diagnosis below. FINAL IMPRESSION      1. Severe sepsis ([address]    2. Acute alcoholic intoxication without complication ([address]    3. COPD exacerbation ([address]    4.  Thrombocytopenia ([address]          DISPOSITION/PLAN   DISPOSITION: Admit medical stable condition           (Please note that portions of this note were completed with a voice recognition program.  Efforts were made to edit the dictations but occasionally words are mis-transcribed.)    Sara Murrell PA-C (electronically signed)           Lisa Ybarra PA-C  08/17/20 0836

## 2020-08-17 NOTE — PROGRESS NOTES
Pharmacy Note  Vancomycin Consult    Yuliya Garcia is a 62 y.o. female started on Vancomycin for sepsis due to UTI; consult received from Dr. Carey Siddiqi to manage therapy. Also receiving the following antibiotics: cefepime and metronidazole. Patient Active Problem List   Diagnosis    Sepsis due to urinary tract infection (HCC)    Lactic acidosis    Alcoholism, chronic (HCC)    Alcoholic cirrhosis (HCC)    Thrombocytopenia (HCC)       Allergies:  Patient has no known allergies. Temp max: 98.9    Recent Labs     08/17/20  1141 08/17/20  1732   BUN 13 11       Recent Labs     08/17/20  1141 08/17/20  1732   CREATININE <0.5* <0.5*       Recent Labs     08/17/20  1141 08/17/20  1732   WBC 4.3 1.9*       No intake or output data in the 24 hours ending 08/17/20 1843    Culture Date      Source                       Results  8/17                      urine                             pending    Ht Readings from Last 1 Encounters:   08/17/20 5' 9\" (1.753 m)        Wt Readings from Last 1 Encounters:   08/17/20 160 lb (72.6 kg)         Body mass index is 23.63 kg/m². CrCl cannot be calculated (This lab value cannot be used to calculate CrCl because it is not a number: <0.5). Goal Trough Level: 10-15 mcg/mL    Assessment/Plan:  Will initiate Vancomycin with a one time loading dose of 1000 mg x1, followed by 1000 mg IV every 12 hours. Timing of trough level will be determined based on culture results, renal function, and clinical response. Thank you for the consult. Will continue to follow.

## 2020-08-17 NOTE — CARE COORDINATION
Discharge Planning Assessment    SW discharge planner met with patient to discuss reason for admission, current living situation, and potential needs at the time of discharge. Pt in ED d/t sepsis and acute alcohol intoxication    Demographics/Insurance verified:  Yes    Current type of dwellin [facility] steps - pt reported ok w/stairs    Living arrangements:  w/spouse    Level of function/Support:  Pt reports she is independent with ADLs. Stated spouse is supportive and so are some other family members in the area. PCP:  None. SW provided pt with [facility] brochure explaining their services. Pt reported she did meet with [facility] financial counselor, in the ED. Last Visit to PCP:  n/a    DME:  None. Active with any community resources/agencies/skilled home care:  None. No non-skilled needs reported    Medication compliance issues:  No    Financial issues that could impact healthcare:  No    Tentative discharge plan:  Home most likely. Pt was provided alcohol rehab resources. Provided [facility] brochure for follow up with doctor in the community. No other needs identified at this time. Discussed with patient and/or family that on the day of discharge home tentative time of discharge will be between 10 AM and noon. Transportation at the time of discharge:  Pt drives. Reported spouse does not d/t stroke. Stated she has other family members that can pick her up.     Electronically signed by CHARISMA Zamora, KYLAH on 2020 at 4:00 PM

## 2020-08-17 NOTE — ED NOTES
Pt report given to Pete Stuart RN, states no questions or concerns at this time. Pt transported to floor via wheelchair by floor RN. Pt transported on portable telemetry.       [address] Sylvie Medellin RN  08/17/20 Logan Devries RN  08/17/20 3439

## 2020-08-17 NOTE — ED NOTES
Provided resources for inpatient and outpatient drug and alcohol treatment services. Patient states \" I have never did any treatment except when I was admitted the last time and after that I started back up it' got to stop. \" Patient was receptive  to the resources provided.       Milagro Troy  08/17/20 4147

## 2020-08-18 LAB
A/G RATIO: 1.6 (ref 1.1–2.2)
ALBUMIN SERPL-MCNC: 3.9 G/DL (ref 3.4–5)
ALP BLD-CCNC: 95 U/L (ref 40–129)
ALT SERPL-CCNC: 58 U/L (ref 10–40)
ANION GAP SERPL CALCULATED.3IONS-SCNC: 12 MMOL/L (ref 3–16)
APTT: 28.6 SEC (ref 24.2–36.2)
AST SERPL-CCNC: 109 U/L (ref 15–37)
BASOPHILS ABSOLUTE: 0 K/UL (ref 0–0.2)
BASOPHILS ABSOLUTE: 0 K/UL (ref 0–0.2)
BASOPHILS RELATIVE PERCENT: 0.2 %
BASOPHILS RELATIVE PERCENT: 0.2 %
BILIRUB SERPL-MCNC: 1.4 MG/DL (ref 0–1)
BUN BLDV-MCNC: 9 MG/DL (ref 7–20)
CALCIUM SERPL-MCNC: 8.6 MG/DL (ref 8.3–10.6)
CHLORIDE BLD-SCNC: 97 MMOL/L (ref 99–110)
CO2: 26 MMOL/L (ref 21–32)
CREAT SERPL-MCNC: <0.5 MG/DL (ref 0.6–1.1)
D DIMER: 313 NG/ML DDU (ref 0–229)
EKG ATRIAL RATE: 90 BPM
EKG DIAGNOSIS: NORMAL
EKG P AXIS: 81 DEGREES
EKG P-R INTERVAL: 136 MS
EKG Q-T INTERVAL: 414 MS
EKG QRS DURATION: 86 MS
EKG QTC CALCULATION (BAZETT): 506 MS
EKG R AXIS: 29 DEGREES
EKG T AXIS: 80 DEGREES
EKG VENTRICULAR RATE: 90 BPM
EOSINOPHILS ABSOLUTE: 0 K/UL (ref 0–0.6)
EOSINOPHILS ABSOLUTE: 0 K/UL (ref 0–0.6)
EOSINOPHILS RELATIVE PERCENT: 0 %
EOSINOPHILS RELATIVE PERCENT: 0.1 %
ESTIMATED AVERAGE GLUCOSE: 102.5 MG/DL
FERRITIN: 2429 NG/ML (ref 15–150)
FIBRINOGEN: 250 MG/DL (ref 200–397)
GFR AFRICAN AMERICAN: >60
GFR NON-AFRICAN AMERICAN: >60
GLOBULIN: 2.5 G/DL
GLUCOSE BLD-MCNC: 136 MG/DL (ref 70–99)
GLUCOSE BLD-MCNC: 150 MG/DL (ref 70–99)
GLUCOSE BLD-MCNC: 190 MG/DL (ref 70–99)
GLUCOSE BLD-MCNC: 207 MG/DL (ref 70–99)
GLUCOSE BLD-MCNC: 234 MG/DL (ref 70–99)
GLUCOSE BLD-MCNC: 242 MG/DL (ref 70–99)
GLUCOSE BLD-MCNC: 271 MG/DL (ref 70–99)
HBA1C MFR BLD: 5.2 %
HCT VFR BLD CALC: 39.7 % (ref 36–48)
HCT VFR BLD CALC: 44.9 % (ref 36–48)
HEMATOLOGY PATH CONSULT: NORMAL
HEMATOLOGY PATH CONSULT: YES
HEMOGLOBIN: 13.6 G/DL (ref 12–16)
HEMOGLOBIN: 14.8 G/DL (ref 12–16)
LACTIC ACID: 0.9 MMOL/L (ref 0.4–2)
LACTIC ACID: 1 MMOL/L (ref 0.4–2)
LACTIC ACID: 2 MMOL/L (ref 0.4–2)
LACTIC ACID: 2.1 MMOL/L (ref 0.4–2)
LYMPHOCYTES ABSOLUTE: 0.2 K/UL (ref 1–5.1)
LYMPHOCYTES ABSOLUTE: 0.4 K/UL (ref 1–5.1)
LYMPHOCYTES RELATIVE PERCENT: 12.2 %
LYMPHOCYTES RELATIVE PERCENT: 8.7 %
MCH RBC QN AUTO: 35.5 PG (ref 26–34)
MCH RBC QN AUTO: 35.6 PG (ref 26–34)
MCHC RBC AUTO-ENTMCNC: 33.1 G/DL (ref 31–36)
MCHC RBC AUTO-ENTMCNC: 34.2 G/DL (ref 31–36)
MCV RBC AUTO: 103.9 FL (ref 80–100)
MCV RBC AUTO: 107.2 FL (ref 80–100)
MONOCYTES ABSOLUTE: 0 K/UL (ref 0–1.3)
MONOCYTES ABSOLUTE: 0.3 K/UL (ref 0–1.3)
MONOCYTES RELATIVE PERCENT: 0.9 %
MONOCYTES RELATIVE PERCENT: 8.1 %
NEUTROPHILS ABSOLUTE: 1.7 K/UL (ref 1.7–7.7)
NEUTROPHILS ABSOLUTE: 2.5 K/UL (ref 1.7–7.7)
NEUTROPHILS RELATIVE PERCENT: 79.5 %
NEUTROPHILS RELATIVE PERCENT: 90.1 %
PDW BLD-RTO: 13.3 % (ref 12.4–15.4)
PDW BLD-RTO: 13.3 % (ref 12.4–15.4)
PERFORMED ON: ABNORMAL
PLATELET # BLD: 59 K/UL (ref 135–450)
PLATELET # BLD: 61 K/UL (ref 135–450)
PMV BLD AUTO: 7.9 FL (ref 5–10.5)
PMV BLD AUTO: 8.4 FL (ref 5–10.5)
POTASSIUM REFLEX MAGNESIUM: 3.9 MMOL/L (ref 3.5–5.1)
PROLACTIN: 3.3 NG/ML
RBC # BLD: 3.82 M/UL (ref 4–5.2)
RBC # BLD: 4.18 M/UL (ref 4–5.2)
SARS-COV-2, NAA: NOT DETECTED
SLIDE REVIEW: ABNORMAL
SODIUM BLD-SCNC: 135 MMOL/L (ref 136–145)
TOTAL PROTEIN: 6.4 G/DL (ref 6.4–8.2)
WBC # BLD: 1.9 K/UL (ref 4–11)
WBC # BLD: 3.1 K/UL (ref 4–11)

## 2020-08-18 PROCEDURE — 85025 COMPLETE CBC W/AUTO DIFF WBC: CPT

## 2020-08-18 PROCEDURE — 6360000002 HC RX W HCPCS: Performed by: HOSPITALIST

## 2020-08-18 PROCEDURE — 85384 FIBRINOGEN ACTIVITY: CPT

## 2020-08-18 PROCEDURE — 2500000003 HC RX 250 WO HCPCS: Performed by: HOSPITALIST

## 2020-08-18 PROCEDURE — 94150 VITAL CAPACITY TEST: CPT

## 2020-08-18 PROCEDURE — 2580000003 HC RX 258: Performed by: HOSPITALIST

## 2020-08-18 PROCEDURE — 85379 FIBRIN DEGRADATION QUANT: CPT

## 2020-08-18 PROCEDURE — 6370000000 HC RX 637 (ALT 250 FOR IP): Performed by: HOSPITALIST

## 2020-08-18 PROCEDURE — 36415 COLL VENOUS BLD VENIPUNCTURE: CPT

## 2020-08-18 PROCEDURE — 83605 ASSAY OF LACTIC ACID: CPT

## 2020-08-18 PROCEDURE — 94761 N-INVAS EAR/PLS OXIMETRY MLT: CPT

## 2020-08-18 PROCEDURE — 93010 ELECTROCARDIOGRAM REPORT: CPT | Performed by: INTERNAL MEDICINE

## 2020-08-18 PROCEDURE — 2000000000 HC ICU R&B

## 2020-08-18 PROCEDURE — 2700000000 HC OXYGEN THERAPY PER DAY

## 2020-08-18 PROCEDURE — 80053 COMPREHEN METABOLIC PANEL: CPT

## 2020-08-18 PROCEDURE — 85730 THROMBOPLASTIN TIME PARTIAL: CPT

## 2020-08-18 RX ORDER — THIAMINE MONONITRATE (VIT B1) 100 MG
100 TABLET ORAL DAILY
Status: DISCONTINUED | OUTPATIENT
Start: 2020-08-21 | End: 2020-08-19 | Stop reason: HOSPADM

## 2020-08-18 RX ORDER — PANTOPRAZOLE SODIUM 40 MG/1
40 TABLET, DELAYED RELEASE ORAL
Status: DISCONTINUED | OUTPATIENT
Start: 2020-08-19 | End: 2020-08-19 | Stop reason: HOSPADM

## 2020-08-18 RX ADMIN — INSULIN LISPRO 3 UNITS: 100 INJECTION, SOLUTION INTRAVENOUS; SUBCUTANEOUS at 00:21

## 2020-08-18 RX ADMIN — Medication 10 ML: at 21:13

## 2020-08-18 RX ADMIN — METRONIDAZOLE 500 MG: 500 INJECTION, SOLUTION INTRAVENOUS at 18:13

## 2020-08-18 RX ADMIN — INSULIN LISPRO 1 UNITS: 100 INJECTION, SOLUTION INTRAVENOUS; SUBCUTANEOUS at 09:32

## 2020-08-18 RX ADMIN — METRONIDAZOLE 500 MG: 500 INJECTION, SOLUTION INTRAVENOUS at 00:14

## 2020-08-18 RX ADMIN — SODIUM CHLORIDE, SODIUM LACTATE, POTASSIUM CHLORIDE, CALCIUM CHLORIDE AND DEXTROSE MONOHYDRATE: 5; 600; 310; 30; 20 INJECTION, SOLUTION INTRAVENOUS at 04:25

## 2020-08-18 RX ADMIN — INSULIN LISPRO 2 UNITS: 100 INJECTION, SOLUTION INTRAVENOUS; SUBCUTANEOUS at 04:25

## 2020-08-18 RX ADMIN — CEFEPIME HYDROCHLORIDE 2 G: 2 INJECTION, POWDER, FOR SOLUTION INTRAVENOUS at 04:17

## 2020-08-18 RX ADMIN — VANCOMYCIN HYDROCHLORIDE 1000 MG: 1 INJECTION, POWDER, LYOPHILIZED, FOR SOLUTION INTRAVENOUS at 21:13

## 2020-08-18 RX ADMIN — CEFEPIME HYDROCHLORIDE 2 G: 2 INJECTION, POWDER, FOR SOLUTION INTRAVENOUS at 15:33

## 2020-08-18 RX ADMIN — METRONIDAZOLE 500 MG: 500 INJECTION, SOLUTION INTRAVENOUS at 09:44

## 2020-08-18 RX ADMIN — THIAMINE HYDROCHLORIDE 300 MG: 100 INJECTION, SOLUTION INTRAMUSCULAR; INTRAVENOUS at 15:49

## 2020-08-18 RX ADMIN — VANCOMYCIN HYDROCHLORIDE 1000 MG: 1 INJECTION, POWDER, LYOPHILIZED, FOR SOLUTION INTRAVENOUS at 09:43

## 2020-08-18 RX ADMIN — INSULIN LISPRO 2 UNITS: 100 INJECTION, SOLUTION INTRAVENOUS; SUBCUTANEOUS at 12:50

## 2020-08-18 RX ADMIN — INSULIN LISPRO 1 UNITS: 100 INJECTION, SOLUTION INTRAVENOUS; SUBCUTANEOUS at 15:46

## 2020-08-18 RX ADMIN — Medication 10 ML: at 09:36

## 2020-08-18 ASSESSMENT — PAIN SCALES - GENERAL
PAINLEVEL_OUTOF10: 0

## 2020-08-18 NOTE — PROGRESS NOTES
Pt became was coughing, became nauseated and vomited a small amount of sputum. Pt scored a 3 on CIWA scale at 1600.

## 2020-08-18 NOTE — H&P
Gastrointestinal: Positive for nausea, vomiting, diarrhea  Genitourinary: Negative for polyuria, dysuria   Hematologic/Lymphatic: Negative for bleeding tendency, easy bruising  Musculoskeletal: Positive for myalgias and arthralgias  Neurologic: Negative for confusion,dysarthria; positive tremulousness  Skin: Negative for itching,rash  Psychiatric: Positive for depression,anxiety, and EtOH abuse  Endocrine: Negative for polydipsia,polyuria,heat /cold intolerance. Past Medical History:   has no past medical history on file. Past Surgical History:   has no past surgical history on file. Medications:  No current facility-administered medications on file prior to encounter. Current Outpatient Medications on File Prior to Encounter   Medication Sig Dispense Refill    B Complex Vitamins (VITAMIN B COMPLEX PO) Take 1 tablet by mouth daily      Multiple Vitamins-Minerals (MULTIVITAMIN ADULT PO) Take 1 tablet by mouth daily      Ergocalciferol (VITAMIN D2) 50 MCG (2000 UT) TABS Take 1 tablet by mouth daily      Caffeine-Magnesium Salicylate (DIUREX PO) Take 1 tablet by mouth daily         Allergies:  No Known Allergies     Social History:  Patient Lives  reports that she has been smoking cigarettes. She has a 25.00 pack-year smoking history. She has never used smokeless tobacco. She reports current alcohol use. Patient drinks 1 pint of hard liquor daily times decades    Family History:  family history is not on file. Physical Exam:  BP (!) 141/86   Pulse 95   Temp 98.6 °F (37 °C) (Temporal)   Resp 19   Ht 5' 9\" (1.753 m)   Wt 160 lb (72.6 kg)   SpO2 92%   BMI 23.63 kg/m²     General appearance: Acutely uncomfortable with heavy odor of ethanol  Eyes: Sclera clear, pupils equal; no scleral icterus  ENT: Moist mucus membranes, no thrush. Trachea midline. Cardiovascular: Tachycardic, normal S1, S2. No murmur, gallop, rub.  No edema in lower extremities  Respiratory: Positive rhonchi with expiratory wheeze; positive tachypnea and nonproductive cough   gastrointestinal: Abdomen soft, non-tender, not distended, normal bowel sounds; no HSM or caput medusa  Musculoskeletal: No cyanosis in digits, neck supple  Neurology: Cranial nerves grossly intact. Alert and oriented in time, place and person. No speech or motor deficits; no tongue or peripheral tremor  Psychiatry: Anxious, depressed, and agitated; no SI no HI  Skin: Warm, dry, normal turgor, no rash  Brisk capillary refill, peripheral pulses palpable   Labs:  CBC:   Lab Results   Component Value Date    WBC 1.9 08/17/2020    RBC 4.18 08/17/2020    HGB 14.8 08/17/2020    HCT 44.9 08/17/2020    .2 08/17/2020    MCH 35.5 08/17/2020    MCHC 33.1 08/17/2020    RDW 13.3 08/17/2020    PLT 61 08/17/2020    MPV 8.4 08/17/2020     BMP:    Lab Results   Component Value Date     08/17/2020    K 4.1 08/17/2020    CL 98 08/17/2020    CO2 14 08/17/2020    BUN 11 08/17/2020    CREATININE <0.5 08/17/2020    CALCIUM 8.5 08/17/2020    GFRAA >60 08/17/2020    LABGLOM >60 08/17/2020    GLUCOSE 165 08/17/2020     CTA CHEST ABDOMEN PELVIS W CONTRAST   Final Result   1. No pulmonary embolism or other acute process in the chest, abdomen, or   pelvis. 2. Diffuse hepatic steatosis and lobular contour of the liver surface raising   the possibility of cirrhosis. No focal hepatic abnormality. 3. Mild colonic diverticulosis. XR CHEST PORTABLE   Final Result   No acute process.            Chest Xray:   EKG:    I visualized CXR images and EKG strips    Ventricular Rate  90 P BPM  QTc Calculation (Bazett)  506 P ms    Atrial Rate  90 P BPM  P Marion  81 P degrees    P-R Interval  136 P ms  R Axis  29 P degrees    QRS Duration  86 P ms  T Axis  80 P degrees    Q-T Interval  414 P ms  Diagnosis  Normal sinus rhythmNonspecific ST abnormalityProlonged QTAbnormal ECG P          Discussed case  with ED provider    Problem List  Active Problems:    Sepsis due to urinary tract infection (HCC)    Lactic acidosis    Alcoholism, chronic (HCC)    Alcoholic cirrhosis (HCC)    Thrombocytopenia ([facility]  Resolved Problems:    * No resolved hospital problems. *        Assessment/Plan:     1. Patient received volume resuscitation per sepsis protocol; she was then initiated on broad-spectrum vancomycin, cefepime, and Flagyl pending cultures (due to leukopenia and increasing lactate levels); COVID-19 swab obtained and SARS-CoV-2 protocol initiated; supplemental O2, Combivent MDI, incentive spirometry ordered. 2.  Alcoholic ketosis required addition of D5 to IV fluids overnight; POC glucose checks every 4 hours with Humalog SSI PRN; advance diet as tolerated; PPI added for GI prophylaxis    3. Patient admitted under CIWA protocol with banana bag infusing; discussed cirrhosis and comorbidities including electrolyte disturbance, transaminitis, coagulopathy, marrow suppression, etc. related to her ethanol abuse; encouraged inpatient rehab at time of discharge; echo scheduled for a.m. DVT prophylaxis-BLE SCD due to thrombocytopenia  Code status-full code  Diet-n.p.o. advance to clear liquid diet as tolerated   IV access-PIV established in ED      Admit as inpatient. I anticipate hospitalization spanning more than two midnights for investigation and treatment of the above medically necessary diagnoses. Please note that some part of this chart was generated using Dragon dictation software. Although every effort was made to ensure the accuracy of this automated transcription, some errors in transcription may have occurred inadvertently. If you may need any clarification, please do not hesitate to contact me through [facility].        Nash Salas MD    8/18/2020 2:48 AM

## 2020-08-18 NOTE — PROGRESS NOTES
[facility] PROGRESS NOTE    8/18/2020 12:19 PM        Name: Cathie Boland Admitted: 8/17/2020  Primary Care Provider: No primary care provider on file. (Tel: None)                        Subjective:  . No acute events overnight. Resting well. Pain control. Diet ok. Labs reviewed  Denies any chest pain sob.      Reviewed interval ancillary notes    Current Medications  metronidazole (FLAGYL) 500 mg in NaCl 100 mL IVPB premix, Q8H  sodium chloride flush 0.9 % injection 10 mL, 2 times per day  sodium chloride flush 0.9 % injection 10 mL, PRN  LORazepam (ATIVAN) tablet 1 mg, Q1H PRN    Or  LORazepam (ATIVAN) injection 1 mg, Q1H PRN    Or  LORazepam (ATIVAN) tablet 2 mg, Q1H PRN    Or  LORazepam (ATIVAN) injection 2 mg, Q1H PRN    Or  LORazepam (ATIVAN) tablet 3 mg, Q1H PRN    Or  LORazepam (ATIVAN) injection 3 mg, Q1H PRN    Or  LORazepam (ATIVAN) tablet 4 mg, Q1H PRN    Or  LORazepam (ATIVAN) injection 4 mg, Q1H PRN  acetaminophen (TYLENOL) tablet 650 mg, Q6H PRN    Or  acetaminophen (TYLENOL) suppository 650 mg, Q6H PRN  vancomycin 1000 mg IVPB in 250 mL D5W addavial, Q12H  cefepime (MAXIPIME) 2 g IVPB minibag, Q12H  sodium chloride flush 0.9 % injection 10 mL, PRN  potassium chloride (KLOR-CON M) extended release tablet 40 mEq, PRN    Or  potassium bicarb-citric acid (EFFER-K) effervescent tablet 40 mEq, PRN    Or  potassium chloride 10 mEq/100 mL IVPB (Peripheral Line), PRN  magnesium sulfate 1 g in dextrose 5% 100 mL IVPB, PRN  acetaminophen (TYLENOL) tablet 650 mg, Q6H PRN    Or  acetaminophen (TYLENOL) suppository 650 mg, Q6H PRN  glucose (GLUTOSE) 40 % oral gel 15 g, PRN  dextrose 50 % IV solution, PRN  glucagon (rDNA) injection 1 mg, PRN  dextrose 5 % solution, PRN  promethazine (PHENERGAN) tablet 12.5 mg, Q6H PRN    Or  ondansetron (ZOFRAN) injection 4 mg, Q6H PRN  nicotine (NICODERM CQ) 21 MG/24HR 1 patch, Daily  insulin lispro (1 Unit Dial) 0-6 Units, Q4H        Objective:  /77   Pulse 113   Temp 98.4 °F (36.9 °C) (Temporal)   Resp 22   Ht 5' 9\" (1.753 m)   Wt 139 lb 8 oz (63.3 kg)   SpO2 94%   BMI 20.60 kg/m²     Intake/Output Summary (Last 24 hours) at 8/18/2020 1219  Last data filed at 8/18/2020 0645  Gross per 24 hour   Intake 290 ml   Output 1425 ml   Net -1135 ml      Wt Readings from Last 3 Encounters:   08/18/20 139 lb 8 oz (63.3 kg)       General appearance:  Appears comfortable  Eyes: Sclera clear. Pupils equal.  ENT: Moist oral mucosa. Trachea midline, no adenopathy. Cardiovascular: Regular rhythm, normal S1, S2. No murmur. No edema in lower extremities  Respiratory: Not using accessory muscles. Good inspiratory effort. Clear to auscultation bilaterally, no wheeze or crackles. GI: Abdomen soft, no tenderness, not distended, normal bowel sounds  Musculoskeletal: No cyanosis in digits, neck supple  Neurology: CN 2-12 grossly intact. No speech or motor deficits  Psych: Normal affect.  Alert and oriented in time, place and person  Skin: Warm, dry, normal turgor    Labs and Tests:  CBC:   Recent Labs     08/17/20  1141 08/17/20  1732 08/18/20  0428   WBC 4.3 1.9* 3.1*   HGB 16.5* 14.8 13.6   * 61* 59*     BMP:    Recent Labs     08/17/20  1141 08/17/20  1732 08/18/20  0428    137 135*   K 4.1 4.1 3.9   CL 99 98* 97*   CO2 24 14* 26   BUN 13 11 9   CREATININE <0.5* <0.5* <0.5*   GLUCOSE 96 165* 234*     Hepatic:   Recent Labs     08/17/20  1141 08/17/20  1732 08/18/20  0428   * 144* 109*   ALT 70* 64* 58*   BILITOT 2.0* 1.4* 1.4*   ALKPHOS 125 105 95       Discussed care with family and patient             Spent 30  minutes with patient and family at bedside and on unit reviewing medical records and labs, spent greater than 50% time counseling patient and family on diagnosis and plan   Problem List  Active Problems:    Sepsis due to urinary tract infection (HCC)    Lactic acidosis    Alcoholism, chronic (HCC)    Alcoholic cirrhosis (HCC)    Thrombocytopenia ([facility] [address]  Resolved Problems:    * No resolved hospital problems. *       Assessment & Plan:   1. Sepsis  - secondary to  UTI  - continue Iv abx  - await culture    ETOH abuse  - [facility] protocol    Leukopenia  -Likely secondary to sepsis alcohol abuse  -Dr. mccann COPD  - home inhalers.   duonebs          Diet: DIET CLEAR LIQUID; No Added Salt (3-4 GM)  Code:Full Code  DVT PPX lovenox       Cindra Essex, MD   8/18/2020 12:19 PM

## 2020-08-18 NOTE — PROGRESS NOTES
Pt in bed, A&Ox4, no complaints of pain, lung sounds diminished in lower lobes. O2 sat 94% on 2L NC.CIWA score of 0 on initial assessment. Pt tolerated clear liquid breakfast. Pts spouse called and asked if he could come and visit pt, cannot allow until pt has a negative covid-19 test. Will continue to monitor.

## 2020-08-19 VITALS
RESPIRATION RATE: 18 BRPM | WEIGHT: 139.11 LBS | HEART RATE: 115 BPM | HEIGHT: 69 IN | BODY MASS INDEX: 20.6 KG/M2 | TEMPERATURE: 98.6 F | SYSTOLIC BLOOD PRESSURE: 118 MMHG | OXYGEN SATURATION: 98 % | DIASTOLIC BLOOD PRESSURE: 76 MMHG

## 2020-08-19 LAB
A/G RATIO: 1.6 (ref 1.1–2.2)
ALBUMIN SERPL-MCNC: 3.9 G/DL (ref 3.4–5)
ALP BLD-CCNC: 97 U/L (ref 40–129)
ALT SERPL-CCNC: 68 U/L (ref 10–40)
ANION GAP SERPL CALCULATED.3IONS-SCNC: 13 MMOL/L (ref 3–16)
APTT: 28.1 SEC (ref 24.2–36.2)
AST SERPL-CCNC: 143 U/L (ref 15–37)
BASOPHILS ABSOLUTE: 0 K/UL (ref 0–0.2)
BASOPHILS RELATIVE PERCENT: 0.2 %
BILIRUB SERPL-MCNC: 2.3 MG/DL (ref 0–1)
BUN BLDV-MCNC: 5 MG/DL (ref 7–20)
CALCIUM SERPL-MCNC: 8.7 MG/DL (ref 8.3–10.6)
CHLORIDE BLD-SCNC: 94 MMOL/L (ref 99–110)
CO2: 26 MMOL/L (ref 21–32)
CREAT SERPL-MCNC: <0.5 MG/DL (ref 0.6–1.1)
D DIMER: 243 NG/ML DDU (ref 0–229)
EOSINOPHILS ABSOLUTE: 0 K/UL (ref 0–0.6)
EOSINOPHILS RELATIVE PERCENT: 0.2 %
FIBRINOGEN: 252 MG/DL (ref 200–397)
GFR AFRICAN AMERICAN: >60
GFR NON-AFRICAN AMERICAN: >60
GLOBULIN: 2.4 G/DL
GLUCOSE BLD-MCNC: 112 MG/DL (ref 70–99)
GLUCOSE BLD-MCNC: 117 MG/DL (ref 70–99)
GLUCOSE BLD-MCNC: 123 MG/DL (ref 70–99)
GLUCOSE BLD-MCNC: 131 MG/DL (ref 70–99)
GLUCOSE BLD-MCNC: 165 MG/DL (ref 70–99)
HCT VFR BLD CALC: 42.5 % (ref 36–48)
HEMOGLOBIN: 15 G/DL (ref 12–16)
LACTIC ACID: 0.9 MMOL/L (ref 0.4–2)
LACTIC ACID: 0.9 MMOL/L (ref 0.4–2)
LYMPHOCYTES ABSOLUTE: 0.8 K/UL (ref 1–5.1)
LYMPHOCYTES RELATIVE PERCENT: 22.8 %
MAGNESIUM: 1.1 MG/DL (ref 1.8–2.4)
MCH RBC QN AUTO: 36.5 PG (ref 26–34)
MCHC RBC AUTO-ENTMCNC: 35.3 G/DL (ref 31–36)
MCV RBC AUTO: 103.3 FL (ref 80–100)
MONOCYTES ABSOLUTE: 0.2 K/UL (ref 0–1.3)
MONOCYTES RELATIVE PERCENT: 5.4 %
NEUTROPHILS ABSOLUTE: 2.5 K/UL (ref 1.7–7.7)
NEUTROPHILS RELATIVE PERCENT: 71.4 %
PDW BLD-RTO: 13 % (ref 12.4–15.4)
PERFORMED ON: ABNORMAL
PLATELET # BLD: 47 K/UL (ref 135–450)
PMV BLD AUTO: 7.4 FL (ref 5–10.5)
POTASSIUM REFLEX MAGNESIUM: 3.1 MMOL/L (ref 3.5–5.1)
RBC # BLD: 4.11 M/UL (ref 4–5.2)
SODIUM BLD-SCNC: 133 MMOL/L (ref 136–145)
TOTAL PROTEIN: 6.3 G/DL (ref 6.4–8.2)
WBC # BLD: 3.6 K/UL (ref 4–11)

## 2020-08-19 PROCEDURE — 85025 COMPLETE CBC W/AUTO DIFF WBC: CPT

## 2020-08-19 PROCEDURE — 6370000000 HC RX 637 (ALT 250 FOR IP): Performed by: HOSPITALIST

## 2020-08-19 PROCEDURE — 2500000003 HC RX 250 WO HCPCS: Performed by: HOSPITALIST

## 2020-08-19 PROCEDURE — 85730 THROMBOPLASTIN TIME PARTIAL: CPT

## 2020-08-19 PROCEDURE — 2580000003 HC RX 258: Performed by: HOSPITALIST

## 2020-08-19 PROCEDURE — 83605 ASSAY OF LACTIC ACID: CPT

## 2020-08-19 PROCEDURE — 6360000002 HC RX W HCPCS: Performed by: HOSPITALIST

## 2020-08-19 PROCEDURE — 80053 COMPREHEN METABOLIC PANEL: CPT

## 2020-08-19 PROCEDURE — 85379 FIBRIN DEGRADATION QUANT: CPT

## 2020-08-19 PROCEDURE — 36415 COLL VENOUS BLD VENIPUNCTURE: CPT

## 2020-08-19 PROCEDURE — 85384 FIBRINOGEN ACTIVITY: CPT

## 2020-08-19 PROCEDURE — 83735 ASSAY OF MAGNESIUM: CPT

## 2020-08-19 RX ORDER — CIPROFLOXACIN 500 MG/1
500 TABLET, FILM COATED ORAL 2 TIMES DAILY
Qty: 8 TABLET | Refills: 0 | Status: SHIPPED | OUTPATIENT
Start: 2020-08-19 | End: 2020-08-23

## 2020-08-19 RX ORDER — MAGNESIUM SULFATE IN WATER 40 MG/ML
4 INJECTION, SOLUTION INTRAVENOUS ONCE
Status: COMPLETED | OUTPATIENT
Start: 2020-08-19 | End: 2020-08-19

## 2020-08-19 RX ORDER — PANTOPRAZOLE SODIUM 40 MG/1
40 TABLET, DELAYED RELEASE ORAL
Qty: 30 TABLET | Refills: 3 | Status: SHIPPED | OUTPATIENT
Start: 2020-08-20

## 2020-08-19 RX ADMIN — CEFEPIME HYDROCHLORIDE 2 G: 2 INJECTION, POWDER, FOR SOLUTION INTRAVENOUS at 02:46

## 2020-08-19 RX ADMIN — POTASSIUM CHLORIDE 40 MEQ: 1500 TABLET, EXTENDED RELEASE ORAL at 12:55

## 2020-08-19 RX ADMIN — Medication 10 ML: at 12:54

## 2020-08-19 RX ADMIN — Medication 10 ML: at 13:06

## 2020-08-19 RX ADMIN — METRONIDAZOLE 500 MG: 500 INJECTION, SOLUTION INTRAVENOUS at 01:11

## 2020-08-19 RX ADMIN — LORAZEPAM 1 MG: 2 INJECTION INTRAMUSCULAR; INTRAVENOUS at 01:15

## 2020-08-19 RX ADMIN — THIAMINE HYDROCHLORIDE 300 MG: 100 INJECTION, SOLUTION INTRAMUSCULAR; INTRAVENOUS at 16:23

## 2020-08-19 RX ADMIN — Medication 10 ML: at 15:44

## 2020-08-19 RX ADMIN — PANTOPRAZOLE SODIUM 40 MG: 40 TABLET, DELAYED RELEASE ORAL at 06:14

## 2020-08-19 RX ADMIN — INSULIN LISPRO 1 UNITS: 100 INJECTION, SOLUTION INTRAVENOUS; SUBCUTANEOUS at 13:12

## 2020-08-19 RX ADMIN — Medication 1 G: at 12:55

## 2020-08-19 RX ADMIN — MAGNESIUM SULFATE HEPTAHYDRATE 4 G: 40 INJECTION, SOLUTION INTRAVENOUS at 08:25

## 2020-08-19 ASSESSMENT — PAIN SCALES - GENERAL
PAINLEVEL_OUTOF10: 0

## 2020-08-19 NOTE — PROGRESS NOTES
Pt assessment complete, pt on 2L O2 via NC. Inspiratory wheezes noted, pt has a non-productive cough, coughed a few times and wheezing subsided. A&Ox4, CIWA score 0. Diet changed from clear liquid diet to a no salt added diet. Pt covid-19 result is negative.

## 2020-08-19 NOTE — PROGRESS NOTES
K+ lab value of 3.1 on morning labs, pt given K+ 40 mEq  ER tablet. Magnesium level of 1.10, 4g of magnesium administered IVPB. See morning labs to monitor effectiveness of electrolyte replacement.

## 2020-08-19 NOTE — PROGRESS NOTES
Pt assessment completed and documented- see doc flowsheet. Pt alert and oriented on 2L NC. VSS, medications given per MAR. Updated on POC. White board updated. Denies further needs at this time. Will continue to monitor.

## 2020-08-19 NOTE — PROGRESS NOTES
PRN  nicotine (NICODERM CQ) 21 MG/24HR 1 patch, Daily  insulin lispro (1 Unit Dial) 0-6 Units, Q4H        Objective:  BP (!) 143/83   Pulse 118   Temp 97.9 °F (36.6 °C) (Temporal)   Resp 18   Ht 5' 9\" (1.753 m)   Wt 139 lb 1.8 oz (63.1 kg)   SpO2 95%   BMI 20.54 kg/m²     Intake/Output Summary (Last 24 hours) at 8/19/2020 1422  Last data filed at 8/19/2020 0615  Gross per 24 hour   Intake 760 ml   Output 850 ml   Net -90 ml      Wt Readings from Last 3 Encounters:   08/19/20 139 lb 1.8 oz (63.1 kg)       General appearance:  Appears comfortable  Eyes: Sclera clear. Pupils equal.  ENT: Moist oral mucosa. Trachea midline, no adenopathy. Cardiovascular: Regular rhythm, normal S1, S2. No murmur. No edema in lower extremities  Respiratory: Not using accessory muscles. Good inspiratory effort. Clear to auscultation bilaterally, no wheeze or crackles. GI: Abdomen soft, no tenderness, not distended, normal bowel sounds  Musculoskeletal: No cyanosis in digits, neck supple  Neurology: CN 2-12 grossly intact. No speech or motor deficits  Psych: Normal affect.  Alert and oriented in time, place and person  Skin: Warm, dry, normal turgor    Labs and Tests:  CBC:   Recent Labs     08/17/20 1732 08/18/20 0428 08/19/20 0423   WBC 1.9* 3.1* 3.6*   HGB 14.8 13.6 15.0   PLT 61* 59* 47*     BMP:    Recent Labs     08/17/20 1732 08/18/20 0428 08/19/20  0422    135* 133*   K 4.1 3.9 3.1*   CL 98* 97* 94*   CO2 14* 26 26   BUN 11 9 5*   CREATININE <0.5* <0.5* <0.5*   GLUCOSE 165* 234* 112*     Hepatic:   Recent Labs     08/17/20 1732 08/18/20 0428 08/19/20  0422   * 109* 143*   ALT 64* 58* 68*   BILITOT 1.4* 1.4* 2.3*   ALKPHOS 105 95 97       Discussed care with family and patient             Spent 30  minutes with patient and family at bedside and on unit reviewing medical records and labs, spent greater than 50% time counseling patient and family on diagnosis and plan   Problem List  Active Problems:

## 2020-08-19 NOTE — DISCHARGE INSTR - COC
Continuity of Care Form    Patient Name: Sunshine Purvis   :  1960  MRN:  [id number]    Admit date:  2020  Discharge date:  ***    Code Status Order: Full Code   Advance Directives:   [facility] Documentation     Date/Time Healthcare Directive Type of Healthcare Directive Copy in [facility] [address] Agent's Name Healthcare Agent's Phone Number    20 1754  No, patient does not have an advance directive for healthcare treatment -- -- -- -- --          Admitting Physician:  Kalina Morris MD  PCP: No primary care provider on file. Discharging Nurse: [facility] Unit/Room#: VJS-0240/6108-61  Discharging Unit Phone Number: ***    Emergency Contact:   Extended Emergency Contact Information  Primary Emergency Contact: Laurita Diaz Phone: [phone]  Relation: Spouse    Past Surgical History:  History reviewed. No pertinent surgical history. Immunization History: There is no immunization history on file for this patient.     Active Problems:  Patient Active Problem List   Diagnosis Code    Sepsis due to urinary tract infection ([facility] [address] A41.9, N39.0    Lactic acidosis E87.2    Alcoholism, chronic (HCC) Z71.29    Alcoholic cirrhosis ([facility] [address] F72.34    Thrombocytopenia ([facility] [address] D69.6       Isolation/Infection:   Isolation          Droplet Plus        Patient Infection Status     Infection Onset Added Last Indicated Last Indicated By Review Planned Expiration Resolved Resolved By    None active    Resolved    COVID-19 Rule Out 20 COVID-19 (Ordered)   20 Rule-Out Test Resulted          Nurse Assessment:  Last Vital Signs: /76   Pulse 115   Temp 98.6 °F (37 °C) (Temporal)   Resp 18   Ht 5' 9\" (1.753 m)   Wt 139 lb 1.8 oz (63.1 kg)   SpO2 98%   BMI 20.54 kg/m²     Last documented pain score (0-10 scale): Pain Level: 0  Last Weight:   Wt Readings from Last 1 Encounters:   20 139 lb 1.8 oz (63.1 kg) Mental Status:  {IP PT MENTAL STATUS:}    IV Access:  { QUINTIN IV ACCESS:543271219}    Nursing Mobility/ADLs:  Walking   {P DME TKYR:925697487}  Transfer  {P DME IZQN:880611873}  Bathing  {CHP DME LKCM:941836856}  Dressing  {P DME VWJL:127352609}  Toileting  {[facility] DME BWSN:740507363}  Feeding  {[facility] DME KWKJ:118041138}  Med Admin  {P DME ZWOF:835583582}  Med Delivery   { QUINTIN MED Delivery:265028392}    Wound Care Documentation and Therapy:        Elimination:  Continence:   · Bowel: {YES / DR:87978}  · Bladder: {YES / QU:26226}  Urinary Catheter: {Urinary Catheter:224391538}   Colostomy/Ileostomy/Ileal Conduit: {YES / ZN:39307}       Date of Last BM: ***    Intake/Output Summary (Last 24 hours) at 2020  Last data filed at 2020 1500  Gross per 24 hour   Intake 860 ml   Output 1025 ml   Net -165 ml     I/O last 3 completed shifts:   In: 860 [I.V.:760; IV Piggyback:100]  Out: 1025 [Urine:1025]    Safety Concerns:     [employer] Safety Concerns:991200496}    Impairments/Disabilities:      [employer] Impairments/Disabilities:163708459}    Nutrition Therapy:  Current Nutrition Therapy:   [employer] Diet List:747903526}    Routes of Feeding: {[facility] DME Other Feedings:853497672}  Liquids: {Slp liquid thickness:05803}  Daily Fluid Restriction: {P DME Yes amt example:054864271}  Last Modified Barium Swallow with Video (Video Swallowing Test): {Done Not Done WZKI:550592508}    Treatments at the Time of Hospital Discharge:   Respiratory Treatments: ***  Oxygen Therapy:  {Therapy; copd oxygen:07679}  Ventilator:    { CC Vent DJSL:843539786}    Rehab Therapies: {THERAPEUTIC INTERVENTION:4067004819}  Weight Bearing Status/Restrictions: { CC Weight Bearin}  Other Medical Equipment (for information only, NOT a DME order):  {EQUIPMENT:663680616}  Other Treatments: ***    Patient's personal belongings (please select all that are sent with patient):  {[facility] DME Belongings:863654537}    RN SIGNATURE: {Esignature:830391673}    CASE MANAGEMENT/SOCIAL WORK SECTION    Inpatient Status Date: ***    Readmission Risk Assessment Score:  Readmission Risk              Risk of Unplanned Readmission:        12           Discharging to Facility/ Agency   · Name:   · Address:  · Phone:  · Fax:    Dialysis Facility (if applicable)   · Name:  · Address:  · Dialysis Schedule:  · Phone:  · Fax:    / signature: {Esignature:161570529}    PHYSICIAN SECTION    Prognosis: {Prognosis:8194704413}    Condition at Discharge: [address] [place] [address] Patient Condition:321508845}    Rehab Potential (if transferring to Rehab): {Prognosis:8967719962}    Recommended Labs or Other Treatments After Discharge: ***    Physician Certification: I certify the above information and transfer of Padilla Lee  is necessary for the continuing treatment of the diagnosis listed and that she requires {Admit to Appropriate Level of Care:87042} for {GREATER/LESS:852016850} 30 days.      Update Admission H&P: {CHP DME Changes in XDTTU:747967658}    PHYSICIAN SIGNATURE:  {Esignature:360255639}

## 2020-08-19 NOTE — PROGRESS NOTES
Written discharge instructions reviewed with patient. [facility] Rehab info provided upon arrival to ED. Scripts sent electronically to [facility] on [address]. Pt discharged to home with belongings. Ambulated to lobby with SBA per RN. Gait steady. Transported home with spouse per private vehicle.

## 2020-08-21 LAB
BLOOD CULTURE, ROUTINE: NORMAL
CULTURE, BLOOD 2: NORMAL

## 2020-08-21 NOTE — DISCHARGE SUMMARY
[facility] DISCHARGE SUMMARY    Patient Demographics    Patient. Orin Napier  Date of Birth. 1960  MRN. [id number]     Primary care provider. No primary care provider on file. (Tel: None)    Admit date: 8/17/2020    Discharge date (blank if same as Note Date): 8/19/2020  Note Date: 8/21/2020     Reason for Hospitalization. Chief Complaint   Patient presents with    Alcohol Problem     pt states she drinks approx a pint a day. last drink was approc 2 hrs ago. Pt having nausea and diff breathing           Problem-based Hospital Course. 1. Sepsis  - secondary to  UTI  - resolved   - treated with iv abx and transiionted to oral abx      ETOH abuse       Leukopenia  -Likely secondary to sepsis alcohol abuse        COPD  - home inhalers. duonebs    Consults. IP CONSULT TO SOCIAL WORK  IP CONSULT TO HOSPITALIST  IP CONSULT TO SOCIAL WORK    Physical examination on discharge day. /76   Pulse 115   Temp 98.6 °F (37 °C) (Temporal)   Resp 18   Ht 5' 9\" (1.753 m)   Wt 139 lb 1.8 oz (63.1 kg)   SpO2 98%   BMI 20.54 kg/m²   General appearance. Alert. Looks comfortable. HEENT. Sclera clear. Moist mucus membranes. Cardiovascular. Regular rate and rhythm, normal S1, S2. No murmur. Respiratory. Not using accessory muscles. Clear to auscultation bilaterally, no wheeze. Gastrointestinal. Abdomen soft, non-tender, not distended, normal bowel sounds  Neurology. Facial symmetry. No speech deficits. Moving all extremities equally. Extremities. No edema in lower extremities. Skin. Warm, dry, normal turgor    Condition at time of discharge stable     Medication instructions provided to patient at discharge.      Medication List      START taking these medications    ciprofloxacin 500 MG tablet  Commonly known as:  CIPRO  Take 1 tablet by mouth 2 times daily for 4 days     pantoprazole 40 MG tablet  Commonly known as:  PROTONIX  Take 1 tablet by mouth every morning (before breakfast)        CONTINUE taking these medications    DIUREX PO     MULTIVITAMIN ADULT PO     VITAMIN B COMPLEX PO     Vitamin D2 50 MCG (2000 UT) Tabs           Where to Get Your Medications      These medications were sent to [facility], [address] [facility]  [address] [facility], [id number] [place] [address],[address] [address]    Phone:  [phone]   · ciprofloxacin 500 MG tablet  · pantoprazole 40 MG tablet         Discharge recommendations given to patient. Follow Up. pcp in 1 week   Disposition. home  Activity. activity as tolerated  Diet: No diet orders on file      Spent 45  minutes in discharge process.     Signed:  Neyda Marshall MD     8/21/2020 2:54 PM